# Patient Record
Sex: MALE | URBAN - METROPOLITAN AREA
[De-identification: names, ages, dates, MRNs, and addresses within clinical notes are randomized per-mention and may not be internally consistent; named-entity substitution may affect disease eponyms.]

---

## 2021-12-03 ENCOUNTER — APPOINTMENT (RX ONLY)
Dept: URBAN - METROPOLITAN AREA CLINIC 111 | Facility: CLINIC | Age: 71
Setting detail: DERMATOLOGY
End: 2021-12-03

## 2021-12-03 DIAGNOSIS — L82.1 OTHER SEBORRHEIC KERATOSIS: ICD-10-CM

## 2021-12-03 DIAGNOSIS — L29.89 OTHER PRURITUS: ICD-10-CM

## 2021-12-03 DIAGNOSIS — L29.8 OTHER PRURITUS: ICD-10-CM

## 2021-12-03 DIAGNOSIS — L81.4 OTHER MELANIN HYPERPIGMENTATION: ICD-10-CM

## 2021-12-03 PROCEDURE — 99203 OFFICE O/P NEW LOW 30 MIN: CPT

## 2021-12-03 PROCEDURE — ? PRESCRIPTION

## 2021-12-03 PROCEDURE — ? COUNSELING

## 2021-12-03 PROCEDURE — ? PRESCRIPTION MEDICATION MANAGEMENT

## 2021-12-03 RX ORDER — FLUOCINONIDE 0.5 MG/G
CREAM TOPICAL
Qty: 60 | Refills: 2 | Status: ERX

## 2021-12-03 RX ORDER — FLUOCINONIDE 0.5 MG/G
CREAM TOPICAL
Qty: 60 | Refills: 2 | Status: ERX | COMMUNITY
Start: 2021-12-03

## 2021-12-03 RX ADMIN — FLUOCINONIDE: 0.5 CREAM TOPICAL at 00:00

## 2021-12-03 ASSESSMENT — LOCATION ZONE DERM
LOCATION ZONE: TRUNK
LOCATION ZONE: HAND

## 2021-12-03 ASSESSMENT — LOCATION DETAILED DESCRIPTION DERM
LOCATION DETAILED: LEFT ULNAR DORSAL HAND
LOCATION DETAILED: RIGHT SUPERIOR MEDIAL UPPER BACK
LOCATION DETAILED: RIGHT RADIAL DORSAL HAND

## 2021-12-03 ASSESSMENT — LOCATION SIMPLE DESCRIPTION DERM
LOCATION SIMPLE: LEFT HAND
LOCATION SIMPLE: RIGHT HAND
LOCATION SIMPLE: RIGHT UPPER BACK

## 2021-12-03 NOTE — PROCEDURE: PRESCRIPTION MEDICATION MANAGEMENT
Render In Strict Bullet Format?: No
Detail Level: Zone
Initiate Treatment: -\\n- Fluocinonide Cream: Apply to affected areas (hands, arms) twice daily for two weeks on, one week off. Repeat as needed for flares. Avoid face/groin/axillae.\\n- Recommended OTC antihistamines\\n- Recommended applying ice to soothe itching

## 2021-12-03 NOTE — HPI: EVALUATION OF SKIN LESION(S)
What Type Of Note Output Would You Prefer (Optional)?: Bullet Format
How Severe Are Your Spot(S)?: moderate
Hpi Title: Evaluation of Skin Lesions
Additional History: Patient would like back evaluated today.